# Patient Record
Sex: FEMALE | Race: WHITE | NOT HISPANIC OR LATINO
[De-identification: names, ages, dates, MRNs, and addresses within clinical notes are randomized per-mention and may not be internally consistent; named-entity substitution may affect disease eponyms.]

---

## 2019-03-19 ENCOUNTER — APPOINTMENT (OUTPATIENT)
Dept: ORTHOPEDIC SURGERY | Facility: CLINIC | Age: 29
End: 2019-03-19
Payer: COMMERCIAL

## 2019-03-19 ENCOUNTER — TRANSCRIPTION ENCOUNTER (OUTPATIENT)
Age: 29
End: 2019-03-19

## 2019-03-19 VITALS — BODY MASS INDEX: 21.51 KG/M2 | WEIGHT: 126 LBS | HEIGHT: 64 IN

## 2019-03-19 DIAGNOSIS — M89.8X6 OTHER SPECIFIED DISORDERS OF BONE, LOWER LEG: ICD-10-CM

## 2019-03-19 PROBLEM — Z00.00 ENCOUNTER FOR PREVENTIVE HEALTH EXAMINATION: Status: ACTIVE | Noted: 2019-03-19

## 2019-03-19 PROCEDURE — 99204 OFFICE O/P NEW MOD 45 MIN: CPT

## 2019-03-19 PROCEDURE — 73590 X-RAY EXAM OF LOWER LEG: CPT | Mod: LT

## 2019-03-19 NOTE — PHYSICAL EXAM
[LE] : Sensory: Intact in bilateral lower extremities [Normal LLE] : Left Lower Extremity: No scars, rashes, lesions, ulcers, skin intact [Normal Touch] : sensation intact for touch [Normal] : Alert and in no acute distress [de-identified] : normal [de-identified] : left leg without swelling and PTT pain\par No bone pain\par full ROM of knee and ankle\par NV intact [de-identified] : NV intact [de-identified] : xray of left tibia and fibula normal

## 2019-03-19 NOTE — REVIEW OF SYSTEMS
[Arthralgia] : arthralgia [Joint Stiffness] : no joint stiffness [Joint Swelling] : no joint swelling [Joint Pain] : joint pain [Negative] : Integumentary

## 2019-03-19 NOTE — PHYSICAL EXAM
[LE] : Sensory: Intact in bilateral lower extremities [Normal LLE] : Left Lower Extremity: No scars, rashes, lesions, ulcers, skin intact [Normal Touch] : sensation intact for touch [Normal] : Alert and in no acute distress [de-identified] : NV intact [de-identified] : normal [de-identified] : left leg without swelling and PTT pain\par No bone pain\par full ROM of knee and ankle\par NV intact [de-identified] : xray of left tibia and fibula normal

## 2019-03-19 NOTE — HISTORY OF PRESENT ILLNESS
[0] : a current pain level of 0/10 [Running] : worsened by running [Acetaminophen] : relieved by acetaminophen [de-identified] : ATRAUMATIC \par 6 wks of pain from exercise without shoes\par no treatment so far\par  [Pain Location] : pain [___ wks] : [unfilled] week(s) ago [Stable] : stable [Direct Pressure] : worsened by direct pressure [de-identified] : PAIN ELICITED BY RUNNING AND JUMPING  [de-identified] : JUMPING

## 2019-03-19 NOTE — REVIEW OF SYSTEMS
[Arthralgia] : arthralgia [Joint Stiffness] : no joint stiffness [Joint Pain] : joint pain [Joint Swelling] : no joint swelling [Negative] : Integumentary

## 2019-03-19 NOTE — DISCUSSION/SUMMARY
[de-identified] : Treatment options were discussed for the patient's left leg condition(s) with the patient. The patient agrees to proceed with the present form of treatment. The patient had an opportunity to ask their questions and they were answered to the best of my ability. The risks, complications, benefits and alternatives of proceeding and not proceeding with the proposed treatment plan were discussed.\par ice\par rest\par otc nsaid for 10 days\par The patient was told that if the symptoms/problem returned, did not improve or worsened then to come back and see me.\par

## 2019-03-19 NOTE — HISTORY OF PRESENT ILLNESS
[0] : a current pain level of 0/10 [Running] : worsened by running [Acetaminophen] : relieved by acetaminophen [Pain Location] : pain [de-identified] : ATRAUMATIC \par 6 wks of pain from exercise without shoes\par no treatment so far\par  [___ wks] : [unfilled] week(s) ago [Stable] : stable [Direct Pressure] : worsened by direct pressure [de-identified] : PAIN ELICITED BY RUNNING AND JUMPING  [de-identified] : JUMPING

## 2019-03-19 NOTE — DISCUSSION/SUMMARY
[de-identified] : Treatment options were discussed for the patient's left leg condition(s) with the patient. The patient agrees to proceed with the present form of treatment. The patient had an opportunity to ask their questions and they were answered to the best of my ability. The risks, complications, benefits and alternatives of proceeding and not proceeding with the proposed treatment plan were discussed.\par ice\par rest\par otc nsaid for 10 days\par The patient was told that if the symptoms/problem returned, did not improve or worsened then to come back and see me.\par

## 2021-01-08 ENCOUNTER — TRANSCRIPTION ENCOUNTER (OUTPATIENT)
Age: 31
End: 2021-01-08

## 2021-08-24 ENCOUNTER — TRANSCRIPTION ENCOUNTER (OUTPATIENT)
Age: 31
End: 2021-08-24

## 2021-11-03 ENCOUNTER — TRANSCRIPTION ENCOUNTER (OUTPATIENT)
Age: 31
End: 2021-11-03

## 2021-11-04 ENCOUNTER — RESULT REVIEW (OUTPATIENT)
Age: 31
End: 2021-11-04

## 2021-11-04 ENCOUNTER — OUTPATIENT (OUTPATIENT)
Dept: OUTPATIENT SERVICES | Facility: HOSPITAL | Age: 31
LOS: 1 days | Discharge: HOME | End: 2021-11-04
Payer: COMMERCIAL

## 2021-11-04 PROCEDURE — 88341 IMHCHEM/IMCYTCHM EA ADD ANTB: CPT | Mod: 26,59

## 2021-11-04 PROCEDURE — 88342 IMHCHEM/IMCYTCHM 1ST ANTB: CPT | Mod: 26,59

## 2021-11-04 PROCEDURE — 88307 TISSUE EXAM BY PATHOLOGIST: CPT | Mod: 26

## 2021-11-04 PROCEDURE — 88360 TUMOR IMMUNOHISTOCHEM/MANUAL: CPT | Mod: 26

## 2021-11-04 PROCEDURE — 88305 TISSUE EXAM BY PATHOLOGIST: CPT | Mod: 26

## 2021-11-10 LAB — SURGICAL PATHOLOGY STUDY: SIGNIFICANT CHANGE UP

## 2021-11-29 ENCOUNTER — TRANSCRIPTION ENCOUNTER (OUTPATIENT)
Age: 31
End: 2021-11-29

## 2022-03-20 ENCOUNTER — TRANSCRIPTION ENCOUNTER (OUTPATIENT)
Age: 32
End: 2022-03-20